# Patient Record
Sex: MALE | Race: WHITE
[De-identification: names, ages, dates, MRNs, and addresses within clinical notes are randomized per-mention and may not be internally consistent; named-entity substitution may affect disease eponyms.]

---

## 2024-01-05 ENCOUNTER — HOSPITAL ENCOUNTER
Dept: HOSPITAL 101 - PETCT | Age: 69
Discharge: HOME | End: 2024-01-05
Payer: COMMERCIAL

## 2024-01-05 DIAGNOSIS — N20.0: ICD-10-CM

## 2024-01-05 DIAGNOSIS — R91.1: Primary | ICD-10-CM

## 2024-01-05 DIAGNOSIS — K76.0: ICD-10-CM

## 2024-01-05 DIAGNOSIS — R59.9: ICD-10-CM

## 2024-01-05 PROCEDURE — 78815 PET IMAGE W/CT SKULL-THIGH: CPT

## 2024-01-05 PROCEDURE — A9552 F18 FDG: HCPCS

## 2024-01-05 NOTE — PE_ITS
[address] [address] 
     [phone] 
  
  
Patient Name: 
NEVILLE JI 
  
MRN: [id number]    YOB: 1955    Sex: M 
Assigned Patient Location: PETCT 
Current Patient Location:   
Accession/Order Number: [id number] 
Exam Date: 1/05/2024  16:11    Report Date: 1/06/2024  09:07 
  
At the request of: 
CHA ABDULLAHI   
  
Procedure:  PET skull to mid thigh 
  
NUCLEAR MEDICINE PET/CT 
  
HISTORY: Solitary pulmonary nodule. 
  
COMPARISON: None. 
  
METHOD:  
  
13.47 mCi of F-18 FDG was administered intravenously. Blood sugar level at the  
  
time of the injection: 109. At 52 minutes from injection, PET images were  
obtained from the skull base through the midthigh levels in the axial plane.  
Reformatted images were performed in the sagittal and coronal planes. A  
low-dose, noncontrast CT scan was performed for attenuation correction and  
anatomical localization. A low dose, noncontrast and nondiagnostic CT scan was  
  
performed for attenuation correction and anatomic localization.  
  
Mediastinal blood pool SUV max 2.6 using the patient's body weight as the  
normalization method. 
  
FINDINGS: 
  
HEAD AND NECK: There are no metabolically active lymph nodes in the neck.  
There  
is a left maxillary sinus mucus retention cyst versus polyp. 
  
CHEST: There are no metabolically active mediastinal, hilar, or axillary lymph  
  
nodes. The major airways are patent. There is no pericardial effusion. There  
is  
no evidence of abnormal metabolic uptake in the esophagus. 
  
There is no evidence of abnormal metabolic uptake in the lung parenchyma.  
There  
is a nonmetabolically active right middle lobe 1.5 x 0.7 cm pulmonary nodule.  
There are no pleural effusions. There is no pneumothorax. 
  
ABDOMEN AND PELVIS: There is no evidence of abnormal metabolic activity in the  
  
liver or adrenal glands. There is no evidence of abnormal metabolic active  
lymph nodes in the abdomen or pelvis. There is no free fluid. There is  
physiologic uptake in the urinary system and bowel. The liver is fatty. There  
is a left renal 8 mm stone. There is a left renal 1.6 cm stone. The prostate  
is  
enlarged. 
  
MUSCULOSKELETAL: There is no evidence of abnormal metabolically active bony  
lesions. 
  
ORDER #: [id number] PET/PET skull to mid thigh  
IMPRESSION:  
   
Nonmetabolically active right middle lobe pulmonary nodule. Recommend a single 
  
   
follow-up non-contrast CT chest in 6 months to confirm stability.  
   
Left renal stones.  
   
Fatty liver.  
   
Prostamegaly.  
   
   
Electronically authenticated by: CARL COONEY   Date: 1/06/2024  09:07